# Patient Record
Sex: FEMALE | Race: WHITE
[De-identification: names, ages, dates, MRNs, and addresses within clinical notes are randomized per-mention and may not be internally consistent; named-entity substitution may affect disease eponyms.]

---

## 2021-12-20 ENCOUNTER — HOSPITAL ENCOUNTER (OUTPATIENT)
Dept: HOSPITAL 61 - KCIC MRI | Age: 56
End: 2021-12-20
Payer: OTHER GOVERNMENT

## 2021-12-20 DIAGNOSIS — M67.824: Primary | ICD-10-CM

## 2021-12-20 DIAGNOSIS — M25.522: ICD-10-CM

## 2021-12-20 PROCEDURE — 73221 MRI JOINT UPR EXTREM W/O DYE: CPT

## 2021-12-21 NOTE — [facility]
STUDY: MRI of the left elbow without contrast



INDICATION: Left elbow pain.



COMPARISON: None.



TECHNIQUE: Multiplanar MR imaging of the left elbow performed without the use of intravenous or intra
-articular contrast.



FINDINGS:



Bones/cartilage: No fracture or focally aggressive marrow signal abnormality. Minimal degenerative ch
anges at the radiocapitellar articulation seen as a small area of subchondral sclerosis on image 12 s
eries 8. No active edema at this location.



Musculotendinous: Intact biceps brachii and brachialis. Unremarkable triceps. Mild tendinosis at the 
common extensor origin. No tear or significant tendinosis at the common flexor origin. Muscular bulk 
and signal is within normal limits.



Ligaments: Intact radial collateral ligament, lateral ulnar collateral ligament and annular ligament.
 The ulnar collateral ligament is intact.



Nerves: Normal signal and size of the ulnar nerve with a well-maintained circumferential fat plane.



Miscellaneous: No joint effusion or olecranon bursitis.



IMPRESSION:

1.  Mild tendinosis at the common extensor origin. No surrounding soft tissue or marrow edema to sugg
est active epicondylitis. Unremarkable common flexor origin and rest of the major tendons around the 
elbow.

2.  Intact radial and ulnar collateral ligaments. Minimal arthrosis at the radiocapitellar articulati
on without associated subchondral marrow edema.



Electronically signed by: USHA GRIFFIN MD (12/21/2021 8:14 AM) [id number]